# Patient Record
(demographics unavailable — no encounter records)

---

## 2025-02-21 NOTE — CONSULT LETTER
[Consult Letter:] : I had the pleasure of evaluating your patient, [unfilled]. [Please see my note below.] : Please see my note below. [Consult Closing:] : Thank you very much for allowing me to participate in the care of this patient.  If you have any questions, please do not hesitate to contact me. [Sincerely,] : Sincerely, [FreeTextEntry3] : Ashely Stack MD MSc Pediatric Nephrology [facility]  [phone]

## 2025-02-21 NOTE — PHYSICAL EXAM
[Well Developed] : well developed [Well Nourished] : well nourished [Normal] : soft; non- distended; non-tender; no hepatosplenomegaly or masses [de-identified] : normocephalic atraumatic no conjunctival injection intact extraocular movements, sclera not icteric moist mucous membranes, R tm swollen with injection  [de-identified] : no edema [de-identified] : poor eye contact no focal deficits

## 2025-02-21 NOTE — PHYSICAL EXAM
[Well Developed] : well developed [Well Nourished] : well nourished [Normal] : soft; non- distended; non-tender; no hepatosplenomegaly or masses [de-identified] : normocephalic atraumatic no conjunctival injection intact extraocular movements, sclera not icteric moist mucous membranes, R tm swollen with injection  [de-identified] : no edema [de-identified] : poor eye contact no focal deficits

## 2025-02-21 NOTE — CONSULT LETTER
[Consult Letter:] : I had the pleasure of evaluating your patient, [unfilled]. [Please see my note below.] : Please see my note below. [Consult Closing:] : Thank you very much for allowing me to participate in the care of this patient.  If you have any questions, please do not hesitate to contact me. [Sincerely,] : Sincerely, [FreeTextEntry3] : Ashely Stack MD MSc Pediatric Nephrology [facility]  [phone]

## 2025-05-05 NOTE — CONSULT LETTER
[Consult Letter:] : I had the pleasure of evaluating your patient, [unfilled]. [Please see my note below.] : Please see my note below. [Consult Closing:] : Thank you very much for allowing me to participate in the care of this patient.  If you have any questions, please do not hesitate to contact me. [Sincerely,] : Sincerely, [FreeTextEntry3] : Ashely Stack MD MS Pediatric Nephrology [facility]  [phone]

## 2025-05-05 NOTE — PHYSICAL EXAM
[Well Developed] : well developed [Well Nourished] : well nourished [Normal] : soft; non- distended; non-tender; no hepatosplenomegaly or masses [de-identified] : normocephalic atraumatic no conjunctival injection intact extraocular movements, sclera not icteric moist mucous membranes, R tm swollen with injection  [de-identified] : no edema [de-identified] : poor eye contact no focal deficits

## 2025-05-05 NOTE — CONSULT LETTER
[Consult Letter:] : I had the pleasure of evaluating your patient, [unfilled]. [Please see my note below.] : Please see my note below. [Consult Closing:] : Thank you very much for allowing me to participate in the care of this patient.  If you have any questions, please do not hesitate to contact me. [Sincerely,] : Sincerely, [FreeTextEntry3] : Ashely Stack MD MS Pediatric Nephrology [facility]  [phone]

## 2025-05-05 NOTE — PHYSICAL EXAM
[Well Developed] : well developed [Well Nourished] : well nourished [Normal] : soft; non- distended; non-tender; no hepatosplenomegaly or masses [de-identified] : normocephalic atraumatic no conjunctival injection intact extraocular movements, sclera not icteric moist mucous membranes, R tm swollen with injection  [de-identified] : no edema [de-identified] : poor eye contact no focal deficits

## 2025-05-21 NOTE — PHYSICAL EXAM
[Well Developed] : well developed [Well Nourished] : well nourished [Normal] : soft; non- distended; non-tender; no hepatosplenomegaly or masses [de-identified] : normocephalic atraumatic no conjunctival injection intact extraocular movements, sclera not icteric moist mucous membranes, R tm swollen with injection  [de-identified] : no edema [de-identified] : poor eye contact no focal deficits

## 2025-05-21 NOTE — PHYSICAL EXAM
[Well Developed] : well developed [Well Nourished] : well nourished [Normal] : soft; non- distended; non-tender; no hepatosplenomegaly or masses [de-identified] : normocephalic atraumatic no conjunctival injection intact extraocular movements, sclera not icteric moist mucous membranes, R tm swollen with injection  [de-identified] : no edema [de-identified] : poor eye contact no focal deficits

## 2025-05-21 NOTE — CONSULT LETTER
[Consult Letter:] : I had the pleasure of evaluating your patient, [unfilled]. [Please see my note below.] : Please see my note below. [Consult Closing:] : Thank you very much for allowing me to participate in the care of this patient.  If you have any questions, please do not hesitate to contact me. [Sincerely,] : Sincerely, [FreeTextEntry3] : Ashely Stack MD MS Pediatric Nephrology [facility]  [phone]

## 2025-05-21 NOTE — CONSULT LETTER
[Consult Letter:] : I had the pleasure of evaluating your patient, [unfilled]. [Please see my note below.] : Please see my note below. [Consult Closing:] : Thank you very much for allowing me to participate in the care of this patient.  If you have any questions, please do not hesitate to contact me. [Sincerely,] : Sincerely, [FreeTextEntry3] : Ashely Stack MD MS Pediatric Nephrology [facility]  [phone]

## 2025-05-21 NOTE — REASON FOR VISIT
[Follow-Up] : a follow-up visit for [FreeTextEntry3] : Nephrotic Syndrome  [Patient] : patient [Mother] : mother [Father] : father [Medical Records] : medical records

## 2025-07-11 NOTE — PHYSICAL EXAM
[Well Developed] : well developed [Well Nourished] : well nourished [Normal] : soft; non- distended; non-tender; no hepatosplenomegaly or masses [de-identified] : normocephalic atraumatic no conjunctival injection intact extraocular movements, sclera not icteric moist mucous membranes, R tm swollen with injection  [de-identified] : no edema [de-identified] : poor eye contact no focal deficits

## 2025-07-11 NOTE — REASON FOR VISIT
[Follow-Up] : a follow-up visit for [Patient] : patient [Mother] : mother [Father] : father [Medical Records] : medical records [FreeTextEntry3] : Nephrotic syndrome

## 2025-07-11 NOTE — CONSULT LETTER
[Consult Letter:] : I had the pleasure of evaluating your patient, [unfilled]. [Please see my note below.] : Please see my note below. [Consult Closing:] : Thank you very much for allowing me to participate in the care of this patient.  If you have any questions, please do not hesitate to contact me. [Sincerely,] : Sincerely, [FreeTextEntry3] : Ashely Stack MD MS Pediatric Nephrology [facility]  [phone]

## 2025-07-30 NOTE — PHYSICAL EXAM
[Well Developed] : well developed [Well Nourished] : well nourished [Normal] : soft; non- distended; non-tender; no hepatosplenomegaly or masses [de-identified] : normocephalic atraumatic no conjunctival injection intact extraocular movements, sclera not icteric moist mucous membranes, R tm swollen with injection  [de-identified] : no edema [de-identified] : poor eye contact no focal deficits

## 2025-07-30 NOTE — REASON FOR VISIT
[Follow-Up] : a follow-up visit for [FreeTextEntry3] : Nephrotic syndrome  [Mother] : mother [Father] : father [Medical Records] : medical records

## 2025-07-30 NOTE — PHYSICAL EXAM
[Well Developed] : well developed [Well Nourished] : well nourished [Normal] : soft; non- distended; non-tender; no hepatosplenomegaly or masses [de-identified] : normocephalic atraumatic no conjunctival injection intact extraocular movements, sclera not icteric moist mucous membranes, R tm swollen with injection  [de-identified] : no edema [de-identified] : poor eye contact no focal deficits

## 2025-07-30 NOTE — CONSULT LETTER
[Consult Letter:] : I had the pleasure of evaluating your patient, [unfilled]. [Please see my note below.] : Please see my note below. [Consult Closing:] : Thank you very much for allowing me to participate in the care of this patient.  If you have any questions, please do not hesitate to contact me. [Sincerely,] : Sincerely, [FreeTextEntry3] : Ashely Stack MD MS Pediatric Nephrology [facility]  [phone]

## 2025-07-30 NOTE — CONSULT LETTER
[Consult Letter:] : I had the pleasure of evaluating your patient, [unfilled]. [Please see my note below.] : Please see my note below. [Consult Closing:] : Thank you very much for allowing me to participate in the care of this patient.  If you have any questions, please do not hesitate to contact me. [Sincerely,] : Sincerely, [FreeTextEntry3] : Ashely Stack MD MS Pediatric Nephrology [facility]  [phone]